# Patient Record
Sex: FEMALE | Race: WHITE | URBAN - METROPOLITAN AREA
[De-identification: names, ages, dates, MRNs, and addresses within clinical notes are randomized per-mention and may not be internally consistent; named-entity substitution may affect disease eponyms.]

---

## 2023-01-30 ENCOUNTER — OFFICE VISIT (OUTPATIENT)
Dept: URBAN - METROPOLITAN AREA CLINIC 43 | Facility: CLINIC | Age: 63
End: 2023-01-30
Payer: COMMERCIAL

## 2023-01-30 DIAGNOSIS — H25.13 AGE-RELATED NUCLEAR CATARACT, BILATERAL: ICD-10-CM

## 2023-01-30 DIAGNOSIS — H43.813 VITREOUS DEGENERATION, BILATERAL: Primary | ICD-10-CM

## 2023-01-30 PROCEDURE — 99204 OFFICE O/P NEW MOD 45 MIN: CPT

## 2023-01-30 PROCEDURE — 92134 CPTRZ OPH DX IMG PST SGM RTA: CPT

## 2023-01-30 ASSESSMENT — KERATOMETRY
OS: 44.25
OD: 44.38

## 2023-01-30 ASSESSMENT — INTRAOCULAR PRESSURE
OS: 15
OD: 16

## 2023-01-30 ASSESSMENT — VISUAL ACUITY
OD: 20/30
OS: 20/25

## 2023-01-30 NOTE — IMPRESSION/PLAN
Impression: Vitreous degeneration, bilateral: H43.813. Plan: Symptomatic OS, Ed pt on clinical findings. No retinal breaks/tears/detachments noted today. Advised pt to monitor for signs/symptoms of RD and to RTC stat if they develop. OCT MAC done today, no VMT OU. 

RTC 6 weeks for f/u, DILATE OS ONLY unless symptoms are in OD as well

## 2023-01-30 NOTE — IMPRESSION/PLAN
Impression: Age-related nuclear cataract, bilateral: H25.13. Plan:  Discussed cataracts with patient. Discussed treatment options. Surgical treatment is recommended. Surgical risks and benefits discussed. Patient elects surgical treatment. Recommend surgery OU, OD first. Aim OD: plano. Aim OS: plano. Pt interested in reducing dependence on glasses for distance. Pt reports driving at night difficult due to level of vision at night. Pt has OD at near and may consider keeping OD at near.